# Patient Record
Sex: MALE | Race: WHITE
[De-identification: names, ages, dates, MRNs, and addresses within clinical notes are randomized per-mention and may not be internally consistent; named-entity substitution may affect disease eponyms.]

---

## 2022-01-29 ENCOUNTER — HOSPITAL ENCOUNTER (EMERGENCY)
Dept: HOSPITAL 76 - ED | Age: 72
Discharge: ANOTHER HOSPITAL | End: 2022-01-29
Payer: MEDICARE

## 2022-01-29 ENCOUNTER — HOSPITAL ENCOUNTER (OUTPATIENT)
Dept: HOSPITAL 76 - EMS | Age: 72
Discharge: ANOTHER HOSPITAL | End: 2022-01-29
Payer: MEDICARE

## 2022-01-29 VITALS — DIASTOLIC BLOOD PRESSURE: 69 MMHG | SYSTOLIC BLOOD PRESSURE: 89 MMHG

## 2022-01-29 DIAGNOSIS — I21.4: ICD-10-CM

## 2022-01-29 DIAGNOSIS — I46.9: Primary | ICD-10-CM

## 2022-01-29 DIAGNOSIS — E87.6: Primary | ICD-10-CM

## 2022-01-29 DIAGNOSIS — E87.2: ICD-10-CM

## 2022-01-29 DIAGNOSIS — Z20.822: ICD-10-CM

## 2022-01-29 DIAGNOSIS — S27.0XXA: ICD-10-CM

## 2022-01-29 DIAGNOSIS — R94.5: ICD-10-CM

## 2022-01-29 LAB
ALBUMIN DIAFP-MCNC: 3 G/DL (ref 3.2–5.5)
ALBUMIN/GLOB SERPL: 1.1 {RATIO} (ref 1–2.2)
ALP SERPL-CCNC: 100 IU/L (ref 42–121)
ALT SERPL W P-5'-P-CCNC: 405 IU/L (ref 10–60)
AMPHET UR QL SCN: NEGATIVE
ANION GAP SERPL CALCULATED.4IONS-SCNC: 22 MMOL/L (ref 6–13)
APAP SERPL-MCNC: < 10 UG/ML (ref 10–30)
AST SERPL W P-5'-P-CCNC: 556 IU/L (ref 10–42)
B PARAPERT DNA SPEC QL NAA+PROBE: NOT DETECTED
B PERT DNA SPEC QL NAA+PROBE: NOT DETECTED
BARBITURATES UR QL SCN>300 NG/ML: NEGATIVE
BASE EXCESS BLDV CALC-SCNC: -11.4 MMOL/L
BASOPHILS # BLD MANUAL: 0 10^3/UL (ref 0–0.1)
BASOPHILS NFR BLD AUTO: 0.6 %
BENZODIAZ UR QL SCN: NEGATIVE
BILIRUB BLD-MCNC: 0.5 MG/DL (ref 0.2–1)
BUN SERPL-MCNC: 10 MG/DL (ref 6–20)
C PNEUM DNA NPH QL NAA+NON-PROBE: NOT DETECTED
CALCIUM UR-MCNC: 11.7 MG/DL (ref 8.5–10.3)
CHLORIDE SERPL-SCNC: 102 MMOL/L (ref 101–111)
CLARITY UR REFRACT.AUTO: CLEAR
CO2 BLDV-SCNC: 18.7 MMOL/L (ref 24–29)
CO2 SERPL-SCNC: 21 MMOL/L (ref 21–32)
COCAINE UR-SCNC: NEGATIVE UMOL/L
CREAT SERPLBLD-SCNC: 1.2 MG/DL (ref 0.6–1.2)
EOSINOPHIL # BLD MANUAL: 0.4 10^3/UL (ref 0–0.7)
EOSINOPHIL NFR BLD AUTO: 1.2 %
ERYTHROCYTE [DISTWIDTH] IN BLOOD BY AUTOMATED COUNT: 12.2 % (ref 12–15)
ETHANOL BLD-MCNC: < 5 MG/DL
FLUAV RNA RESP QL NAA+PROBE: NOT DETECTED
GFRSERPLBLD MDRD-ARVRAT: 60 ML/MIN/{1.73_M2} (ref 89–?)
GLOBULIN SER-MCNC: 2.8 G/DL (ref 2.1–4.2)
GLUCOSE SERPL-MCNC: 283 MG/DL (ref 70–100)
GLUCOSE UR QL STRIP.AUTO: 250 MG/DL
HAEM INFLU B DNA SPEC QL NAA+PROBE: NOT DETECTED
HCO3 BLDMV-SCNC: 17.2 MMOL/L (ref 23–28)
HCOV 229E RNA SPEC QL NAA+PROBE: NOT DETECTED
HCOV HKU1 RNA UPPER RESP QL NAA+PROBE: NOT DETECTED
HCOV NL63 RNA ASPIRATE QL NAA+PROBE: NOT DETECTED
HCOV OC43 RNA SPEC QL NAA+PROBE: NOT DETECTED
HCT VFR BLD AUTO: 43.4 % (ref 42–52)
HGB UR QL STRIP: 14.2 G/DL (ref 14–18)
HMPV AG SPEC QL: NOT DETECTED
HPIV1 RNA NPH QL NAA+PROBE: NOT DETECTED
HPIV2 SPEC QL CULT: NOT DETECTED
HPIV3 AB TITR SER CF: NOT DETECTED {TITER}
HPIV4 RNA SPEC QL NAA+PROBE: NOT DETECTED
KETONES UR QL STRIP.AUTO: NEGATIVE MG/DL
LIPASE SERPL-CCNC: 61 U/L (ref 22–51)
LYMPH ABN NFR BLD MANUAL: 0 %
LYMPHOBLASTS # BLD: 29 %
LYMPHOCYTES # BLD MANUAL: 3.5 10^3/UL (ref 1.5–3.5)
LYMPHOCYTES NFR BLD AUTO: 29.2 %
M PNEUMO DNA SPEC QL NAA+PROBE: NOT DETECTED
MANUAL DIF COMMENT BLD-IMP: (no result)
MCH RBC QN AUTO: 32.8 PG (ref 27–31)
MCHC RBC AUTO-ENTMCNC: 32.7 G/DL (ref 32–36)
MCV RBC AUTO: 100.2 FL (ref 80–94)
METHADONE UR QL SCN: NEGATIVE
METHAMPHET UR QL SCN: NEGATIVE
MONOCYTES # BLD MANUAL: 1 10^3/UL (ref 0–1)
MONOCYTES NFR BLD AUTO: 5.8 %
MUCOUS THREADS URNS QL MICRO: (no result)
NEUTROPHILS # SNV AUTO: 12.2 X10^3/UL (ref 4.8–10.8)
NEUTROPHILS NFR BLD AUTO: 57.3 %
NEUTROPHILS NFR BLD MANUAL: 7.3 10^3/UL (ref 1.5–6.6)
NEUTS BAND NFR BLD: 0 %
NITRITE UR QL STRIP.AUTO: NEGATIVE
OPIATES UR QL SCN: POSITIVE
PCO2 BLDV: 48.8 MMHG (ref 41–51)
PDW BLD AUTO: 10.9 FL (ref 7.4–11.4)
PH BLDV: 7.17 [PH] (ref 7.31–7.41)
PH UR STRIP.AUTO: 8 PH (ref 5–7.5)
PLAT MORPH BLD: (no result)
PLATELET # BLD: 188 10^3/UL (ref 130–450)
PLATELET BLD QL SMEAR: (no result)
PO2 BLDV: 203.1 MMHG (ref 25–47)
POTASSIUM SERPL-SCNC: 2.8 MMOL/L (ref 3.5–5)
PROT SPEC-MCNC: 5.8 G/DL (ref 6.7–8.2)
PROT UR STRIP.AUTO-MCNC: 100 MG/DL
RBC # UR STRIP.AUTO: (no result) /UL
RBC # URNS HPF: (no result) /HPF (ref 0–5)
RBC MAR: 4.33 10^6/UL (ref 4.7–6.1)
RBC MORPH BLD: (no result)
RSV RNA RESP QL NAA+PROBE: NOT DETECTED
RV+EV RNA SPEC QL NAA+PROBE: NOT DETECTED
SALICYLATES SERPL-MCNC: < 6 MG/DL
SAO2 DF BLDV: 99 % (ref 60–80)
SARS-COV-2 RNA PNL SPEC NAA+PROBE: NOT DETECTED
SODIUM SERPLBLD-SCNC: 145 MMOL/L (ref 135–145)
SP GR UR STRIP.AUTO: 1.02 (ref 1–1.03)
SQUAMOUS URNS QL MICRO: (no result)
THC UR QL SCN: NEGATIVE
UROBILINOGEN UR QL STRIP.AUTO: (no result) E.U./DL
UROBILINOGEN UR STRIP.AUTO-MCNC: NEGATIVE MG/DL
VOLATILE DRUGS POS SERPL SCN: (no result)
WBC # UR MANUAL: (no result) /HPF (ref 0–3)
WBC MORPH BLD: (no result)

## 2022-01-29 PROCEDURE — 36415 COLL VENOUS BLD VENIPUNCTURE: CPT

## 2022-01-29 PROCEDURE — 80053 COMPREHEN METABOLIC PANEL: CPT

## 2022-01-29 PROCEDURE — 0202U NFCT DS 22 TRGT SARS-COV-2: CPT

## 2022-01-29 PROCEDURE — 80306 DRUG TEST PRSMV INSTRMNT: CPT

## 2022-01-29 PROCEDURE — 94770: CPT

## 2022-01-29 PROCEDURE — 80329 ANALGESICS NON-OPIOID 1 OR 2: CPT

## 2022-01-29 PROCEDURE — 81003 URINALYSIS AUTO W/O SCOPE: CPT

## 2022-01-29 PROCEDURE — 99285 EMERGENCY DEPT VISIT HI MDM: CPT

## 2022-01-29 PROCEDURE — 94002 VENT MGMT INPAT INIT DAY: CPT

## 2022-01-29 PROCEDURE — 99291 CRITICAL CARE FIRST HOUR: CPT

## 2022-01-29 PROCEDURE — 87631 RESP VIRUS 3-5 TARGETS: CPT

## 2022-01-29 PROCEDURE — 81001 URINALYSIS AUTO W/SCOPE: CPT

## 2022-01-29 PROCEDURE — 80307 DRUG TEST PRSMV CHEM ANLYZR: CPT

## 2022-01-29 PROCEDURE — 85025 COMPLETE CBC W/AUTO DIFF WBC: CPT

## 2022-01-29 PROCEDURE — 80320 DRUG SCREEN QUANTALCOHOLS: CPT

## 2022-01-29 PROCEDURE — 71045 X-RAY EXAM CHEST 1 VIEW: CPT

## 2022-01-29 PROCEDURE — 82803 BLOOD GASES ANY COMBINATION: CPT

## 2022-01-29 PROCEDURE — 83690 ASSAY OF LIPASE: CPT

## 2022-01-29 PROCEDURE — 87086 URINE CULTURE/COLONY COUNT: CPT

## 2022-01-29 PROCEDURE — 84484 ASSAY OF TROPONIN QUANT: CPT

## 2022-01-29 PROCEDURE — 93005 ELECTROCARDIOGRAM TRACING: CPT

## 2022-01-29 PROCEDURE — 92950 HEART/LUNG RESUSCITATION CPR: CPT

## 2022-01-29 NOTE — XRAY REPORT
PROCEDURE:  Chest 1 View X-Ray

 

INDICATIONS:  Code

 

TECHNIQUE:  One view of the chest was acquired.  

 

COMPARISON:  None

 

FINDINGS:  

Endotracheal tube tip 5.3 cm above the michael.

 

Heart size and mediastinum unremarkable. There is a 20% right-sided pneumothorax present. Slightly di
splaced right left third rib fracture noted. Subcutaneous air present in the chest wall, right greate
r than left. No mediastinal shift present. Groundglass opacity noted throughout both lungs.

 

IMPRESSION:  

 

Endotracheal tube tip in good position 5.3 cm above the michael.

Right-sided pneumothorax, approximately 20%.

Left third rib fracture. Chest wall subcutaneous air noted in bilaterally, right greater than left

Bilateral pulmonary edema and/or infection.

 

 

Note: Final report is concordant with preliminary report provided by [employer]

 

Reviewed by: Ronald Meeks MD on 1/29/2022 6:56 AM [facility]

Approved by: Ronald Meeks MD on 1/29/2022 6:56 AM [facility]

 

 

Station ID:  [id number]